# Patient Record
(demographics unavailable — no encounter records)

---

## 2021-01-25 NOTE — NUR
550ML OF URINE DRAINED FROM PTS BLADDER VIA STRAIGHT CATH. PT IS RESTING 
COMFORTABLY AT THIS TIME WITH MOM AT BEDSIDE.

## 2021-01-25 NOTE — DIAGNOSTIC IMAGING REPORT
INDICATION: Unable to urinate. Abdominal pain.



FINDINGS: There is a large amount of stool present throughout the

colon. There is impacted stool in the rectal vault measuring

greater than 6 cm. Stomach is not distended. There is no

organomegaly. No calcifications are seen along the kidneys,

ureter or bladder. No bony lesion.



IMPRESSION: Findings of moderate severe constipation with

impacted stool in the rectum.



Dictated by: 



  Dictated on workstation # [id number]

## 2021-01-25 NOTE — ED ABDOMINAL PAIN
General


Stated Complaint:  ABD PAIN





History of Present Illness


Date Seen by Provider:  2021


Time Seen by Provider:  10:12


Initial Comments


8 yo male presents with lower abdominal pain.  Patient reports that he awoke 

with it this morning.  He had a normal bowel movement yesterday.  Complains of 

having hard time pain.  He is very uncomfortable acting especially in the lower 

abdomen in the mid portion and right lower quadrant.  No reports of fevers 

chills nausea vomiting or other systemic complaints.  The child reports that he 

is having a hard time peeing,  has not taken any new medications, has no trauma 

to the genital or pelvic area, has no numbness, tingling or neurologic symptoms.





Allergies and Home Medications


Allergies


Coded Allergies:  


     No Known Drug Allergies (Unverified , 14)





Home Medications


No Active Prescriptions or Reported Meds





Patient Home Medication List


Home Medication List Reviewed:  Yes





Review of Systems


Review of Systems


Constitutional:  No chills, No fever


Respiratory:  Denies Cough, Denies Shortness of Air


Cardiovascular:  Denies Chest Pain, Denies Irregular Heart Rate


Gastrointestinal:  Abdominal Pain


Genitourinary:  See HPI, Pain


Musculoskeletal:  no symptoms reported


Skin:  no symptoms reported


Psychiatric/Neurological:  No Symptoms Reported


Endocrine:  No Symptoms Reported


Hematologic/Lymphatic:  No Symptoms Reported





Past Medical-Social-Family Hx


Past Med/Social Hx:  Reviewed Nursing Past Med/Soc Hx





Physical Exam


Vital Signs





Vital Signs - First Documented








 21





 11:06


 


Temp 35.2


 


Pulse 104


 


Resp 22


 


Pulse Ox 97


 


O2 Delivery Room Air





Capillary Refill :


Height/Weight/BMI


Height: '"


Weight: 7lbs. 0.2oz. [id number];  BMI


Method:


General Appearance:  moderate distress, thin


Neck:  No full range of motion, No supple


Respiratory:  normal breath sounds, no respiratory distress


Cardiovascular:  normal peripheral pulses, regular rate, rhythm


Gastrointestinal:  soft, tenderness (Suprapubic, right lower quadrant)


Genital/Rectal:  normal genital exam, other (Brisk cremaster reflex)


Extremities:  non-tender, normal inspection


Back:  normal inspection, no CVA tenderness


Male:  normal genitalia; No testicular tenderness


Neurologic/Psychiatric:  no motor/sensory deficits, alert


Skin:  normal color, warm/dry





Progress/Results/Core Measures


Results/Orders


Lab Results





Laboratory Tests








Test


 21


10:21 21


10:57 Range/Units


 


 


White Blood Count


 6.2 


 


 4.3-11.0


10^3/uL


 


Red Blood Count


 4.94 


 


 4.05-5.17


10^6/uL


 


Hemoglobin 13.9   10.5-15.1  g/dL


 


Hematocrit 40   30-46  %


 


Mean Corpuscular Volume 81   74-90  fL


 


Mean Corpuscular Hemoglobin 28   25-34  pg


 


Mean Corpuscular Hemoglobin


Concent 35 


 


 32-36  g/dL





 


Red Cell Distribution Width 11.9   10.0-14.5  %


 


Platelet Count


 176 


 


 130-400


10^3/uL


 


Mean Platelet Volume 9.7   9.0-12.2  fL


 


Immature Granulocyte % (Auto) 0    %


 


Neutrophils (%) (Auto) 53   42-75  %


 


Lymphocytes (%) (Auto) 33   12-44  %


 


Monocytes (%) (Auto) 7   0-12  %


 


Eosinophils (%) (Auto) 6   0-10  %


 


Basophils (%) (Auto) 1   0-10  %


 


Neutrophils # (Auto)


 3.3 


 


 1.5-8.0


10^3/uL


 


Lymphocytes # (Auto)


 2.0 


 


 1.5-7.0


10^3/uL


 


Monocytes # (Auto)


 0.5 


 


 0.0-1.0


10^3/uL


 


Eosinophils # (Auto)


 0.4 H


 


 0.0-0.3


10^3/uL


 


Basophils # (Auto)


 0.0 


 


 0.0-0.1


10^3/uL


 


Immature Granulocyte # (Auto)


 0.0 


 


 0.0-0.1


10^3/uL


 


Sodium Level 137   135-145  MMOL/L


 


Potassium Level 4.0   3.6-5.0  MMOL/L


 


Chloride Level 103     MMOL/L


 


Carbon Dioxide Level 24   21-32  MMOL/L


 


Anion Gap 10   5-14  MMOL/L


 


Blood Urea Nitrogen 12   7-18  MG/DL


 


Creatinine


 0.64 


 


 0.60-1.30


MG/DL


 


BUN/Creatinine Ratio 19    


 


Glucose Level 86     MG/DL


 


Calcium Level 9.9   8.5-10.1  MG/DL


 


Corrected Calcium    8.5-10.1  MG/DL


 


Total Bilirubin 0.3   0.1-1.0  MG/DL


 


Aspartate Amino Transf


(AST/SGOT) 29 


 


 5-34  U/L





 


Alanine Aminotransferase


(ALT/SGPT) 26 


 


 0-55  U/L





 


Alkaline Phosphatase 238   100-400  U/L


 


C-Reactive Protein High


Sensitivity 0.01 


 


 0.00-0.50


MG/DL


 


Total Protein 8.0   6.4-8.2  GM/DL


 


Albumin 4.7 H  3.2-4.5  GM/DL


 


Urine Color  YELLOW   


 


Urine Clarity  CLEAR   


 


Urine pH  6.5  5-9  


 


Urine Specific Gravity  1.020  1.016-1.022  


 


Urine Protein  NEGATIVE  NEGATIVE  


 


Urine Glucose (UA)  NEGATIVE  NEGATIVE  


 


Urine Ketones  NEGATIVE  NEGATIVE  


 


Urine Nitrite  NEGATIVE  NEGATIVE  


 


Urine Bilirubin  NEGATIVE  NEGATIVE  


 


Urine Urobilinogen  0.2  < = 1.0  MG/DL


 


Urine Leukocyte Esterase  NEGATIVE  NEGATIVE  


 


Urine RBC (Auto)  NEGATIVE  NEGATIVE  


 


Urine RBC  NONE   /HPF


 


Urine WBC  NONE   /HPF


 


Urine Crystals  NONE   /LPF


 


Urine Bacteria  NEGATIVE   /HPF


 


Urine Casts  NONE   /LPF


 


Urine Mucus  NEGATIVE   /LPF


 


Urine Culture Indicated  NO   








My Orders





Orders - MCMANUS,HAROON L DO


Cbc With Automated Diff (21 10:18)


Comprehensive Metabolic Panel (21 10:18)


Hs C Reactive Protein (21 10:18)


Ua Culture If Indicated (21 10:18)


Ed Iv/Invasive Line Start (21 10:18)


Fentanyl  Injection (Sublimaze Injection (21 10:18)


Straight Cath (Urinary) (21 10:23)


Abdomen/Kub 1view (21 11:34)





Vital Signs/I&O











 21





 11:06


 


Temp 35.2


 


Pulse 104


 


Resp 22


 


B/P (MAP) 


 


Pulse Ox 97


 


O2 Delivery Room Air











Progress


Progress Note :  


   Time:  12:13


Progress Note


Child with severe constipation and probable impaction on x-ray.  Family is with 

him and mom/caregiver is in our hand.  This time they want to attempt some 

MiraLAX and suppositories that they have at home.  Reports that normally they 

have  used fiber but forgot for the last couple days.  Patient's urinary 

retention likely due to severe constipation.  He should drink plenty of fluids 

and use MiraLAX 3-4 times a day along with some glycerin or Dulcolax 

suppository.  Patient is struggled for quite a while with constipation I 

recommend they follow-up with a GI specialist for further outpatient evaluation.





Diagnostic Imaging





   Diagonstic Imaging:  Xray


   Plain Films/CT/US/NM/MRI:  abdomen


Comments


                 [facility] [place] [place]





NAME:   NAIMA CARMONA


[facility] REC#:   [id number]


ACCOUNT#:   [id number]


PT STATUS:   REG ER


:   2014


PHYSICIAN:   HAROON MCMANUS DO


ADMIT DATE:   21/ER


                                   ***Draft***


Date of Exam:21





ABDOMEN/KUB 1VIEW








INDICATION: Unable to urinate. Abdominal pain.





FINDINGS: There is a large amount of stool present throughout the


colon. There is impacted stool in the rectal vault measuring


greater than 6 cm. Stomach is not distended. There is no


organomegaly. No calcifications are seen along the kidneys,


ureter or bladder. No bony lesion.





IMPRESSION: Findings of moderate severe constipation with


impacted stool in the rectum.





  Dictated on workstation # [id number]








Dict:   21 [id number]


Trans:   21 1148


CVB [id number]





Interpreted by:     ROSSY CARRENO MD


Electronically signed by:





Departure


Impression





   Primary Impression:  


   Constipation


   Qualified Codes:  K59.00 - Constipation, unspecified


   Additional Impression:  


   Acute urinary retention


Disposition:   HOME, SELF-CARE


Condition:  Stable





Departure-Patient Inst.


Referrals:  


QUIANA NEWBERRY MD (PCP/Family)


Primary Care Physician


Patient Instructions:  Constipation, Child (DC)





Add. Discharge Instructions:  


MiraLAX 1 capful 2-3 times a day until soft daily stool, may use glycerin or 

Dulcolax suppositories to help initiate bowel movement.  May try a small enema 

as needed.  I recommend plenty of fluids, increase fiber.  I also recommend 

follow-up with pediatrician or pediatric GI specialist for further evaluation of

chronic constipation.


Return to the ER as needed


Scripts


No Active Prescriptions or Reported Meds











HAROON MCMANUS DO               2021 10:18